# Patient Record
Sex: FEMALE | Race: WHITE | Employment: FULL TIME | URBAN - METROPOLITAN AREA
[De-identification: names, ages, dates, MRNs, and addresses within clinical notes are randomized per-mention and may not be internally consistent; named-entity substitution may affect disease eponyms.]

---

## 2017-11-14 ENCOUNTER — OFFICE VISIT (OUTPATIENT)
Dept: ENDOCRINOLOGY CLINIC | Facility: CLINIC | Age: 37
End: 2017-11-14

## 2017-11-14 ENCOUNTER — APPOINTMENT (OUTPATIENT)
Dept: LAB | Facility: HOSPITAL | Age: 37
End: 2017-11-14
Attending: INTERNAL MEDICINE
Payer: COMMERCIAL

## 2017-11-14 VITALS
HEART RATE: 101 BPM | BODY MASS INDEX: 42.02 KG/M2 | DIASTOLIC BLOOD PRESSURE: 96 MMHG | HEIGHT: 64.75 IN | SYSTOLIC BLOOD PRESSURE: 149 MMHG | WEIGHT: 249.19 LBS

## 2017-11-14 DIAGNOSIS — R53.82 CHRONIC FATIGUE: ICD-10-CM

## 2017-11-14 DIAGNOSIS — N92.6 IRREGULAR MENSTRUAL CYCLE: ICD-10-CM

## 2017-11-14 DIAGNOSIS — E55.9 VITAMIN D DEFICIENCY: ICD-10-CM

## 2017-11-14 DIAGNOSIS — E03.9 HYPOTHYROIDISM, UNSPECIFIED TYPE: ICD-10-CM

## 2017-11-14 DIAGNOSIS — E03.9 HYPOTHYROIDISM, UNSPECIFIED TYPE: Primary | ICD-10-CM

## 2017-11-14 PROBLEM — E03.8 OTHER SPECIFIED HYPOTHYROIDISM: Status: ACTIVE | Noted: 2017-11-14

## 2017-11-14 PROCEDURE — 99212 OFFICE O/P EST SF 10 MIN: CPT | Performed by: INTERNAL MEDICINE

## 2017-11-14 PROCEDURE — 84439 ASSAY OF FREE THYROXINE: CPT

## 2017-11-14 PROCEDURE — 99203 OFFICE O/P NEW LOW 30 MIN: CPT | Performed by: INTERNAL MEDICINE

## 2017-11-14 PROCEDURE — 84443 ASSAY THYROID STIM HORMONE: CPT

## 2017-11-14 PROCEDURE — 36415 COLL VENOUS BLD VENIPUNCTURE: CPT

## 2017-11-14 PROCEDURE — 84481 FREE ASSAY (FT-3): CPT

## 2017-11-14 PROCEDURE — 82306 VITAMIN D 25 HYDROXY: CPT

## 2017-11-14 RX ORDER — TOPIRAMATE 25 MG/1
25 TABLET ORAL 2 TIMES DAILY
COMMUNITY

## 2017-11-14 RX ORDER — ONDANSETRON 4 MG/1
4 TABLET, FILM COATED ORAL EVERY 8 HOURS PRN
COMMUNITY
End: 2017-11-14

## 2017-11-14 RX ORDER — ONDANSETRON 4 MG/1
4 TABLET, FILM COATED ORAL EVERY 8 HOURS PRN
Qty: 90 TABLET | Refills: 0 | Status: SHIPPED | OUTPATIENT
Start: 2017-11-14

## 2017-11-14 RX ORDER — LEVOTHYROXINE SODIUM 0.15 MG/1
150 TABLET ORAL
COMMUNITY

## 2017-11-14 RX ORDER — IBUPROFEN 800 MG/1
TABLET ORAL
COMMUNITY
Start: 2017-11-07

## 2017-11-14 RX ORDER — LIOTHYRONINE SODIUM 5 UG/1
5 TABLET ORAL DAILY
COMMUNITY

## 2017-11-14 NOTE — H&P
New Patient Evaluation - History and Physical    CONSULT - Reason for Visit:   PCOS, Hashimotos  Requesting Physician: Self referral    CHIEF COMPLAINT:  Patient presents with:   Other: PCOS, Hashimoto's       HISTORY OF PRESENT ILLNESS:   Gilmer Pavon a mg total) by mouth every 8 (eight) hours as needed for Nausea. Disp: 90 tablet Rfl: 0   Insulin Pen Needle (BD PEN NEEDLE ELVIS U/F) 32G X 4 MM Does not apply Misc Inject once a day.  Disp: 200 each Rfl: 0   METFORMIN HCL 1000 MG OR TABS 1 TABLET TWICE DAILY edema  Endocrine: Negative for: polyuria, polydypsia  Skin: Negative for: rash, blister, cellulitis,      PHYSICAL EXAM:    11/14/17  1555   BP: 149/96   BP Location: Left arm   Patient Position: Sitting   Cuff Size: large   Pulse: 101   Weight: 249 lb 3. 2 not able to, she should increase her synthroid dose by 30 % ( take two extra doses in a week) and contact me as soon as possible. Patient verbalized understanding of above instructions. 3. Fatigue  Will check 25 OH vitamin D level.      4. Obesity

## 2017-11-15 ENCOUNTER — TELEPHONE (OUTPATIENT)
Dept: ENDOCRINOLOGY CLINIC | Facility: CLINIC | Age: 37
End: 2017-11-15

## 2017-11-15 DIAGNOSIS — E03.9 HYPOTHYROIDISM, UNSPECIFIED TYPE: Primary | ICD-10-CM

## 2017-11-15 RX ORDER — LIRAGLUTIDE 6 MG/ML
3 INJECTION, SOLUTION SUBCUTANEOUS DAILY
Qty: 45 ML | Refills: 0 | Status: SHIPPED | OUTPATIENT
Start: 2017-11-15

## 2017-11-15 NOTE — TELEPHONE ENCOUNTER
Spoke with Main [facility]. Discussed below results. She does feel that the TSH level is too high and she feels better with level around 0.5 in the past. She has been complaining of symptoms recently.  She did say she would be ok with continuing with the same dose an

## 2017-11-15 NOTE — TELEPHONE ENCOUNTER
Her labs look normal. Can go over the labs as written below. I recommend continuing with the same dose. Is she okay with the plan? Results for Soy Oneill (MRN [id number]) as of 11/15/2017 10:58   Ref.  Range 11/14/2017 16:58   T4,Free (Direct) [place]

## 2017-11-16 ENCOUNTER — TELEPHONE (OUTPATIENT)
Dept: ENDOCRINOLOGY CLINIC | Facility: CLINIC | Age: 37
End: 2017-11-16

## 2017-11-16 NOTE — TELEPHONE ENCOUNTER
Vitamin D is 23.1, should be around 30. Start ergocalciferol 50,000 units q week x 8 weeks followed by q month. Thanks.

## 2017-11-16 NOTE — TELEPHONE ENCOUNTER
We could go up on the synthroid if she likes. We could do 150/175 alternate day dosing if she likes. I think I see saxenda prescribed along with needels. Please check with the phramacy, if they don't have it, we can send the saxenda.  Okay to prescrib

## 2017-11-17 RX ORDER — ERGOCALCIFEROL 1.25 MG/1
CAPSULE ORAL
Qty: 12 CAPSULE | Refills: 0 | Status: SHIPPED | OUTPATIENT
Start: 2017-11-17

## 2017-11-17 NOTE — TELEPHONE ENCOUNTER
Per patient chart OK to leave detailed message on cell. Left message below and sent Rx. Left call back number if she has further questions.

## 2017-11-17 NOTE — TELEPHONE ENCOUNTER
Bruna Beverly was prescribed by  [facility] as written in office note. Called patient. Informed her of option below of alternating synthroid doses. Patient states she will continue with current dose but would like to recheck labs in 1 month.  If ok with this plan lab

## 2018-01-02 ENCOUNTER — OFFICE VISIT (OUTPATIENT)
Dept: INTERNAL MEDICINE CLINIC | Facility: HOSPITAL | Age: 38
End: 2018-01-02
Attending: EMERGENCY MEDICINE

## 2018-01-02 DIAGNOSIS — J11.1 INFLUENZA: Primary | ICD-10-CM

## 2018-01-02 LAB
FLUAV + FLUBV RNA SPEC NAA+PROBE: NEGATIVE
FLUAV + FLUBV RNA SPEC NAA+PROBE: NEGATIVE
FLUAV + FLUBV RNA SPEC NAA+PROBE: POSITIVE

## 2018-01-02 PROCEDURE — 87631 RESP VIRUS 3-5 TARGETS: CPT

## 2018-01-07 ENCOUNTER — NURSE ONLY (OUTPATIENT)
Dept: FAMILY MEDICINE CLINIC | Facility: CLINIC | Age: 38
End: 2018-01-07

## 2018-01-07 VITALS
DIASTOLIC BLOOD PRESSURE: 76 MMHG | OXYGEN SATURATION: 98 % | HEART RATE: 120 BPM | TEMPERATURE: 98 F | RESPIRATION RATE: 20 BRPM | SYSTOLIC BLOOD PRESSURE: 110 MMHG

## 2018-01-07 DIAGNOSIS — J01.00 ACUTE NON-RECURRENT MAXILLARY SINUSITIS: ICD-10-CM

## 2018-01-07 DIAGNOSIS — J10.1 INFLUENZA A: Primary | ICD-10-CM

## 2018-01-07 PROCEDURE — 99203 OFFICE O/P NEW LOW 30 MIN: CPT | Performed by: PHYSICIAN ASSISTANT

## 2018-01-07 RX ORDER — AMOXICILLIN AND CLAVULANATE POTASSIUM 875; 125 MG/1; MG/1
1 TABLET, FILM COATED ORAL 2 TIMES DAILY
Qty: 20 TABLET | Refills: 0 | Status: SHIPPED | OUTPATIENT
Start: 2018-01-07 | End: 2018-01-17

## 2018-01-07 NOTE — PROGRESS NOTES
CHIEF COMPLAINT:   Patient presents with:  URI      HPI:   Juan Dao is a 40year old female who presents for upper respiratory symptoms for  10 days.  Patient reports: productive cough, fevers on and off, swollen glands in the neck, sweating this AM, t Diagnosis Date   • Eczema    • Hashimoto's disease    • Heartburn    • HYPOTHYROIDISM    • IBS    • Jaundice     at birth   • Leg cramps    • MIGRAINES    • PCOS (polycystic ovarian syndrome)    • Rectal bleeding     6 years ago      Past Surgical History: ASSESSMENT: Influenza a  (primary encounter diagnosis)  Acute non-recurrent maxillary sinusitis  Frederic Palencia was seen today for uri.     Diagnoses and all orders for this visit:    Influenza A    Acute non-recurrent maxillary sinusitis    Other orders  -     Amox Acute sinusitis is inflammation (irritation and swelling) of the sinuses. It is usually due to a viral infection of the sinuses, although bacteria may be involved in prolonged cases. This may follow a cold or other upper respiratory illness.  Your doctor ca © [id number] [address], [address] [place]. All rights reserved. This information is not intended as a substitute for professional medical care. Always follow your healthcare professional's instructions.

## 2019-04-11 PROCEDURE — 87625 HPV TYPES 16 & 18 ONLY: CPT | Performed by: OBSTETRICS & GYNECOLOGY

## 2019-04-11 PROCEDURE — 88175 CYTOPATH C/V AUTO FLUID REDO: CPT | Performed by: OBSTETRICS & GYNECOLOGY

## 2019-04-11 PROCEDURE — 87624 HPV HI-RISK TYP POOLED RSLT: CPT | Performed by: OBSTETRICS & GYNECOLOGY

## 2019-05-02 PROCEDURE — 88305 TISSUE EXAM BY PATHOLOGIST: CPT | Performed by: OBSTETRICS & GYNECOLOGY
